# Patient Record
Sex: MALE | Race: OTHER | HISPANIC OR LATINO | URBAN - METROPOLITAN AREA
[De-identification: names, ages, dates, MRNs, and addresses within clinical notes are randomized per-mention and may not be internally consistent; named-entity substitution may affect disease eponyms.]

---

## 2022-09-19 ENCOUNTER — EMERGENCY (EMERGENCY)
Facility: HOSPITAL | Age: 27
LOS: 1 days | Discharge: LEFT BEFORE DISCHARGE | End: 2022-09-19
Attending: STUDENT IN AN ORGANIZED HEALTH CARE EDUCATION/TRAINING PROGRAM

## 2022-09-19 VITALS
SYSTOLIC BLOOD PRESSURE: 102 MMHG | WEIGHT: 164.91 LBS | HEIGHT: 65 IN | DIASTOLIC BLOOD PRESSURE: 65 MMHG | TEMPERATURE: 98 F | HEART RATE: 61 BPM | OXYGEN SATURATION: 98 % | RESPIRATION RATE: 16 BRPM

## 2022-09-19 PROCEDURE — L9991: CPT

## 2022-09-21 ENCOUNTER — EMERGENCY (EMERGENCY)
Facility: HOSPITAL | Age: 27
LOS: 1 days | Discharge: HOME | End: 2022-09-21
Payer: SELF-PAY

## 2022-09-21 VITALS
HEART RATE: 62 BPM | OXYGEN SATURATION: 97 % | WEIGHT: 139.99 LBS | RESPIRATION RATE: 18 BRPM | TEMPERATURE: 98 F | SYSTOLIC BLOOD PRESSURE: 106 MMHG | DIASTOLIC BLOOD PRESSURE: 63 MMHG | HEIGHT: 62 IN

## 2022-09-21 PROCEDURE — 99283 EMERGENCY DEPT VISIT LOW MDM: CPT | Mod: 25

## 2022-09-21 PROCEDURE — 12002 RPR S/N/AX/GEN/TRNK2.6-7.5CM: CPT

## 2022-09-21 PROCEDURE — G0463: CPT

## 2022-09-21 NOTE — ED ADULT TRIAGE NOTE - TELEPHONIC ID NUMBER OF THE INTERPRETER
[id number] Terbinafine Pregnancy And Lactation Text: This medication is Pregnancy Category B and is considered safe during pregnancy. It is also excreted in breast milk and breast feeding isn't recommended.

## 2022-09-21 NOTE — ED ADULT TRIAGE NOTE - CHIEF COMPLAINT QUOTE
patient here for left chest suture removal, unknown how many there are. states he got them last monday in [facility]. denies redness, discharge or pain to site.

## 2022-09-21 NOTE — ED PROVIDER NOTE - PHYSICAL EXAMINATION
Const: Awake, alert and oriented. In no acute distress. Well appearing.  HEENT: NC/AT. Moist mucous membranes.  Eyes: No scleral icterus. EOMI.  Neck:. Soft and supple. Full ROM without pain.  Cardiac:+S1/S2. No murmurs. Peripheral pulses 2+ and symmetric. No LE edema.  Resp: Speaking in full sentences. No evidence of respiratory distress. No wheezes, rales or rhonchi.  Abd: Soft, non-tender, non-distended. Normal bowel sounds in all 4 quadrants. No guarding or rebound.  Back: Spine midline and non-tender. No CVAT.  Skin: Wound left chest 3.0 cm healing well C/D/I , sutures in place noted   Lymph: No cervical lymphadenopathy.  Neuro: Awake, alert & oriented x 3. Moves all extremities symmetrically.

## 2022-09-21 NOTE — ED PROVIDER NOTE - NSFOLLOWUPINSTRUCTIONS_ED_ALL_ED_FT
Remoción de la sutura, cuidados posteriores    Suture Removal, Care After      La siguiente información ofrece orientación sobre cómo cuidarse después del procedimiento. El médico también podrá darle instrucciones más específicas. Comuníquese con el médico si tiene problemas o preguntas.      ¿Qué puedo esperar después del procedimiento?    Después de que le retiren los puntos (suturas), es normal tener lo siguiente:  •Molestias e hinchazón en la pio.      •Leve enrojecimiento en la pio de la herida.        Siga estas indicaciones en singh casa:    Si tiene un vendaje:     •Lávese las rene con agua y jabón phan al menos 20 segundos antes y después de cambiarse la venda (vendaje). Use desinfectante para rene si no dispone de agua y jabón.      •Cambie el vendaje abhay se lo haya indicado el médico. Si el vendaje se moja, se ensucia o tiene mal olor, cámbielo tan pronto abhay pueda.      •Si el vendaje esta adherido a la piel, aplique agua limpia tibia sobre el vendaje hasta que se afloje y se pueda retirar sin separar los bordes de la herida. Seque blanca la pio con chandrika toalla limpia y suave, dando golpecitos. No frote la herida, ya que puede sangrar.        Cuidado de la herida   Two stitched wounds. One is normal. The other is red with pus and infected. •Controle la herida todos los días para descartar signos de infección. Esté atento a los siguientes signos:  •Aumento del enrojecimiento, la hinchazón o el dolor.      •Líquido o niall.      •Calor, erupción cutánea o dureza en el lugar de la herida de reciente aparición.      • Pus o mal olor.        •Lávese las rene con agua y jabón phan al menos 20 segundos antes y después de tocar la herida. Use desinfectante para rene si no dispone de agua y jabón.      •Mantenga la herida limpia y seca. Limpie y seque la herida con golpecitos abhay se lo haya indicado el médico.      •Solo aplique un ungüento o crema según las indicaciones del médico.      •Si se aplicaron bandas adhesivas o goma para cerrar la piel después de retirar las suturas, déjelas en el lugar. Juan Alberto vez deban dejarse puestos en la piel phan 2 semanas o más. Si los bordes de las tiras adhesivas empiezan a despegarse y enroscarse, puede recortar los que estén sueltos. No retire las tiras adhesivas por completo a menos que el médico se lo indique.      •Continúe protegiendo la herida de lesiones.      • No se toque la herida. [place] puede causar chandrika infección.      Lilly     • No tome lilly de inmersión, no nade ni use el jacuzzi hasta que el médico lo autorice. Pregúntele al médico si puede ducharse.    •Siga estos pasos para ducharse:  •Si tiene un vendaje, quíteselo antes de entrar en la ducha.      •En la ducha, permita que el agua jabonosa caiga sobre la herida. Evite frotar la herida.      •Cuando salga de la ducha, seque la herida dando golpecitos con chandrika toalla limpia.      •Vuelva a aplicar un vendaje sobre la herida, si es necesario.        Cuidado de la cicatriz     Chandrika vez que la herida haya cicatrizado por completo, ayude a reducir el tamaño de la cicatriz de la siguiente manera:  •Use protector solar sobre la cicatriz o cúbrala con ropa cuando se encuentre en el exterior. Las cicatrices se queman fácilmente con el sol, lo que puede empeorar la cicatrización.      •Masajee suavemente la pio de la cicatriz. [place] puede reducir el grosor de la cicatriz.      Indicaciones generales    •Use los medicamentos de venta rima y los recetados solamente abhay se lo haya indicado el médico.      •Concurra a todas las visitas de seguimiento. [place] es importante.        Comuníquese con un médico si:    •Tiene más enrojecimiento, hinchazón o dolor alrededor de la herida.      •Observa líquido o niall que salen de la herida.      •Experimenta calor, erupción cutánea o dureza en el lugar de la herida que antes no se manifestaban.      •Observa pus o percibe mal olor que salen de la herida.      •La herida se abre.        Solicite ayuda de inmediato si:    •Tiene fiebre o escalofríos.      •Tiene líneas rice que salen de la herida.        Resumen    •Después de que le retiren los puntos, es común tener molestias e hinchazón en la pio.      •Lávese las rene con agua y jabón antes de cambiar las vendas (vendaje).      •Mantenga la herida limpia y seca. No tome lilly de inmersión, no nade ni use el jacuzzi hasta que el médico lo autorice.      Esta información no tiene abhay fin reemplazar el consejo del médico. Asegúrese de hacerle al médico cualquier pregunta que tenga.
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
## 2022-09-21 NOTE — ED PROVIDER NOTE - PATIENT PORTAL LINK FT
You can access the FollowMyHealth Patient Portal offered by [facility] by registering at the following website: [email or web address]. By joining [facility]’s FollowMyHealth portal, you will also be able to view your health information using other applications (apps) compatible with our system.

## 2022-12-12 ENCOUNTER — EMERGENCY (EMERGENCY)
Facility: HOSPITAL | Age: 27
LOS: 1 days | Discharge: HOME | End: 2022-12-12
Admitting: STUDENT IN AN ORGANIZED HEALTH CARE EDUCATION/TRAINING PROGRAM
Payer: MEDICAID

## 2022-12-12 VITALS
SYSTOLIC BLOOD PRESSURE: 133 MMHG | RESPIRATION RATE: 18 BRPM | OXYGEN SATURATION: 100 % | HEART RATE: 56 BPM | DIASTOLIC BLOOD PRESSURE: 77 MMHG | TEMPERATURE: 98 F
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
## 2022-12-12 DIAGNOSIS — S51.812A LACERATION WITHOUT FOREIGN BODY OF LEFT FOREARM, INITIAL ENCOUNTER: ICD-10-CM

## 2022-12-12 PROCEDURE — 12002 RPR S/N/AX/GEN/TRNK2.6-7.5CM: CPT

## 2022-12-12 PROCEDURE — 99283 EMERGENCY DEPT VISIT LOW MDM: CPT | Mod: 25

## 2022-12-12 PROCEDURE — 99282 EMERGENCY DEPT VISIT SF MDM: CPT

## 2022-12-12 RX ORDER — IBUPROFEN 200 MG
600 TABLET ORAL ONCE
Refills: 0 | Status: COMPLETED | OUTPATIENT
Start: 2022-12-12 | End: 2022-12-12

## 2022-12-12 RX ORDER — LIDOCAINE HCL 20 MG/ML
10 VIAL (ML) INJECTION ONCE
Refills: 0 | Status: COMPLETED | OUTPATIENT
Start: 2022-12-12 | End: 2022-12-12

## 2022-12-12 RX ADMIN — Medication 10 MILLILITER(S): at 11:49

## 2022-12-12 RX ADMIN — Medication 600 MILLIGRAM(S): at 11:48
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
## 2022-12-12 NOTE — ED PROVIDER NOTE - CLINICAL SUMMARY MEDICAL DECISION MAKING FREE TEXT BOX
Afebrile and hemodynamically stable. He has 3cm left forearm laceration. His LUE is neurovascularly intact. Wound irrigated and anesthetized with lidocaine 1%. 8 simple interrupted sutures placed without complication. Tdap is up to date. Counseled on wound care and return precautions.

## 2022-12-12 NOTE — ED PROVIDER NOTE - PATIENT PORTAL LINK FT
You can access the FollowMyHealth Patient Portal offered by [facility] by registering at the following website: [email or web address]. By joining [facility]’s FollowMyHealth portal, you will also be able to view your health information using other applications (apps) compatible with our system.

## 2022-12-12 NOTE — ED PROVIDER NOTE - PHYSICAL EXAMINATION
VITAL SIGNS: I have reviewed nursing notes and confirm.  CONSTITUTIONAL: Well-developed; in no acute distress.   SKIN:  warm and dry, no acute rash.   HEAD:  normocephalic, atraumatic.  EYES: PERRL, EOM intact; conjunctiva and sclera clear.  ENT: No nasal discharge; airway clear.   NECK: Supple; non tender.  EXT: Normal ROM. No clubbing, cyanosis or edema. 2+ pulses to b/l ue/le.  NEURO: Alert, oriented, grossly unremarkable  PSYCH: Cooperative, mood and affect appropriate.    LUE: FROM at L elbow, wrist and fingers. He has a 3cm linear laceration over the ventral aspect of the left forearm. Sensation and strength intact in median, ulnar and radial nerve distributions. 2+ radial pulse. Distal sensation intact. Cap refill <2 sec.

## 2022-12-12 NOTE — ED PROVIDER NOTE - NSFOLLOWUPINSTRUCTIONS_ED_ALL_ED_FT
Deje el vendaje en por 24 horas. Despues cambie el vendaje diariamente y lave la herida con agua tibia.     Regrese en 10 sigala para quitar los puntos.      Cuidado de un desgarro, en adultos    Laceration Care, Adult      Un desgarro es un socrates que puede atravesar todas las capas de la piel hasta el tejido que se encuentra debajo de la piel. Algunos desgarros cicatrizan por sí solos. Otros se deben cerrar con puntos (suturas), grapas, tiras adhesivas para la piel o goma para cerrar la piel.    El cuidado adecuado de un desgarro reduce el riesgo de infección, ayuda a que el desgarro cierre mejor, y puede prevenir la formación de cicatrices.      Consejos generales    •Mantenga la herida limpia y seca.      • No se rasque ni se toque la herida.      •Lávese las rene con agua y jabón madonna al menos 20 segundos antes y después de tocar la herida o cambiarse la venda (vendaje). Use desinfectante para rene si no dispone de agua y jabón.      • No usedesinfectantes ni antisépticos, abhay alcohol para frotar, para limpiar la herida, a menos que se lo indique singh médico.      •Si le colocaron un vendaje, cámbielo al menos chandrika vez al día o abhay se lo haya indicado el médico. También debe cambiarlo si se moja o se ensucia.        Cómo cuidar del desgarro    Si se utilizaron suturas o grapas:     •Mantenga la herida completamente seca madonna las primeras 24 horas o abhay se lo haya indicado el médico. Transcurrido glen tiempo, puede ducharse o akbar lilly de inmersión. No se sumerja en agua hasta que le hayan quitado las suturas o grapas.    •Limpie la herida chandrika vez por día o abahy se lo haya indicado el médico. Para hacer esto:  •Lave la herida con agua y jabón.      •Enjuáguela con agua para quitar todo el jabón.      •Séquela dando palmaditas con chandrika toalla limpia. No frote la herida.        •Después de limpiar la herida, aplique chandrika delgada capa de ungüento con antibiótico, otros ungüentos tópicos, o un vendaje no adherente abhay se lo haya indicado el médico. Herminie ayudará a prevenir infecciones y a evitar que el vendaje se adhiera a la herida.      •Maxim que le retiren las suturas o las grapas abhay se lo haya indicado el médico. No retire las suturas ni las grapas usted mismo.      Si se utilizaron tiras adhesivas para la piel:     • No deje que las tiras adhesivas para la piel se mojen. Puede bañarse o ducharse, silvio tenga cuidado de mantener la herida seca.      •Si se moja, séquela dando palmaditas con chandrika toalla limpia. No frote la herida.      •Las tiras adhesivas para la piel se caen solas. Si los bordes de las tiras adhesivas empiezan a despegarse y enroscarse, puede recortar los que estén sueltos. No retire las tiras adhesivas por completo a menos que el médico se lo indique.      Si se utilizó goma para cerrar la piel:     •Puede bañarse o ducharse, silvio tenga cuidado de mantener la herida seca. No sumerja la herida en agua.      •Después de ducharse o darse un baño, seque el socrates dando palmaditas con chandrika toalla limpia. No frote la herida.      • No practique actividades que lo joel transpirar mucho hasta que la goma para cerrar la piel se haya caído melany.      • No aplique líquidos, cremas ni ungüentos medicinales en la herida mientras todavía tenga la goma para cerrar la piel. De lo contrario, puede aflojar la película antes de que la herida cicatrice.      •Si la herida está cubierta con un vendaje, no aplique cinta adhesiva directamente sobre la goma para cerrar la piel. De lo contrario, puede hacer que la goma se despegue antes de que la herida cicatrice.      • No toque la goma. La goma para cerrar la piel generalmente permanece sobre la piel de 5 a 10 días y luego se  melany.        Siga estas instrucciones en singh casa:    Medicamentos     •Use los medicamentos de venta rima y los recetados solamente abhay se lo haya indicado el médico.      •Si le recetaron un medicamento o ungüento con antibiótico, tómelo o aplíqueselo abhay se lo haya indicado el médico. No deje de usarlo aunque la afección mejore.      Control del dolor y la hinchazón   •Si se lo indican, aplique hielo sobre la pio de la lesión. Para hacer esto:  •Ponga el hielo en chandrika bolsa plástica.      •Coloque chandriak toalla entre la piel y la bolsa.      •Aplique el hielo madonna 20 minutos, 2 o 3 veces por día.      •Retire el hielo si la piel se pone de color jason brillante. Herminie es muy importante. Si no puede sentir dolor, calor o frío, tiene un mayor riesgo de que se dañe la pio.        •Madonna las primeras 24 a 48 horas después de que le hayan reparado el desgarro, cuando esté sentado o acostado, levante (eleve) la pio de la lesión por encima del nivel del corazón.        Instrucciones generales   Two wounds closed with skin glue. One is normal. The other is red with pus and infected.   •Evite cualquier actividad que pueda hacer que el desgarro vuelva a abrirse.    •Controle la herida todos los días para detectar signos de infección. Esté atento a lo siguiente:  •Aumento del enrojecimiento, la hinchazón o el dolor.      •Líquido o niall.      •Calor.      •Pus o mal olor.        •Concurra a todas las visitas de seguimiento. Herminie es importante.        Comuníquese con un médico si:    •Le aplicaron la vacuna antitetánica y tiene hinchazón, dolor intenso, enrojecimiento o hemorragia en el sitio de la inyección.      •La herida cerrada se abre.    •Tiene cualquiera de estos signos de infección:  •Más enrojecimiento, hinchazón o dolor alrededor de la herida.      •Líquido o niall que salen de la herida.      •Calor que proviene de la herida.      •Pus o mal olor proveniente de la herida.      •Fiebre.        •Nota un cuerpo extraño en la herida, abhay un trozo de meyer o theodora.      •El dolor no se cassandra con los medicamentos.      •Observa que la piel cerca de la herida cambia de color.      •Necesita cambiar el vendaje con frecuencia.      •Presenta chandrika nueva erupción cutánea.      •Tiene entumecimiento alrededor de la herida.        Solicite ayuda de inmediato si:    •Tiene mucha hinchazón alrededor de la herida.      •El dolor aumenta repentinamente y es intenso.      •Presenta nódulos dolorosos cerca de la herida o en la piel en cualquier pio del cuerpo.      •Tiene chandrika línea roman que sale de la herida.      •La herida está en la mano o en el pie, y no puede  correctamente wilbur de los dedos.      •La herida está en la mano o en el pie y observa que los dedos tienen un lindy pálido o azulado.        Resumen    •Un desgarro es un socrates que puede atravesar todas las capas de la piel hasta el tejido que se encuentra debajo de la piel.      •Algunos desgarros cicatrizan por sí solos. Otros se deben cerrar con puntos (suturas), grapas, tiras adhesivas para la piel o goma para cerrar la piel.      •El cuidado adecuado de un desgarro reduce el riesgo de infección, ayuda a que el desgarro cierre mejor, y puede prevenir la formación de cicatrices.      Esta información no tiene abhay fin reemplazar el consejo del médico. Asegúrese de hacerle al médico cualquier pregunta que tenga.
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
## 2022-12-26 ENCOUNTER — EMERGENCY (EMERGENCY)
Facility: HOSPITAL | Age: 27
LOS: 1 days | Discharge: HOME | End: 2022-12-26
Admitting: STUDENT IN AN ORGANIZED HEALTH CARE EDUCATION/TRAINING PROGRAM
Payer: MEDICAID

## 2022-12-26 VITALS
HEART RATE: 70 BPM | SYSTOLIC BLOOD PRESSURE: 126 MMHG | RESPIRATION RATE: 16 BRPM | WEIGHT: 146.61 LBS | TEMPERATURE: 98 F | HEIGHT: 61 IN | OXYGEN SATURATION: 98 % | DIASTOLIC BLOOD PRESSURE: 69 MMHG

## 2022-12-26 PROCEDURE — L9995: CPT

## 2022-12-26 PROCEDURE — 99212 OFFICE O/P EST SF 10 MIN: CPT

## 2022-12-26 RX ORDER — BACITRACIN ZINC 500 UNIT/G
1 OINTMENT IN PACKET (EA) TOPICAL ONCE
Refills: 0 | Status: COMPLETED | OUTPATIENT
Start: 2022-12-26 | End: 2022-12-26

## 2022-12-26 RX ADMIN — Medication 1 APPLICATION(S): at 17:47

## 2022-12-26 NOTE — ED ADULT NURSE NOTE - NSIMPLEMENTINTERV_GEN_ALL_ED
Implemented All Universal Safety Interventions:  [place] to call system. Call bell, personal items and telephone within reach. Instruct patient to call for assistance. Room bathroom lighting operational. Non-slip footwear when patient is off stretcher. Physically safe environment: no spills, clutter or unnecessary equipment. Stretcher in lowest position, wheels locked, appropriate side rails in place.

## 2022-12-26 NOTE — ED PROVIDER NOTE - SKIN, MLM
Skin normal color for race, warm, dry and intact. No evidence of rash, sutures noted in place on left forearm, no surrounding erythema

## 2022-12-26 NOTE — ED PROVIDER NOTE - CLINICAL SUMMARY MEDICAL DECISION MAKING FREE TEXT BOX
Pt seen for suture removal    no signs of infection, sutures removed succesfully discharged home in stable condition

## 2022-12-26 NOTE — ED PROVIDER NOTE - NSFOLLOWUPINSTRUCTIONS_ED_ALL_ED_FT
Apply  over the counter bacitracin or neosporin once a day      return to the ed for any worsening or alarming symptoms         Remoción de la sutura, cuidados posteriores    Suture Removal, Care After      La siguiente información ofrece orientación sobre cómo cuidarse después del procedimiento. El médico también podrá darle instrucciones más específicas. Comuníquese con el médico si tiene problemas o preguntas.      ¿Qué puedo esperar después del procedimiento?    Después de que le retiren los puntos (suturas), es normal tener lo siguiente:  •Molestias e hinchazón en la pio.      •Leve enrojecimiento en la pio de la herida.        Siga estas indicaciones en singh casa:    Si tiene un vendaje:     •Lávese las rene con agua y jabón phan al menos 20 segundos antes y después de cambiarse la venda (vendaje). Use desinfectante para rene si no dispone de agua y jabón.      •Cambie el vendaje abhay se lo haya indicado el médico. Si el vendaje se moja, se ensucia o tiene mal olor, cámbielo tan pronto abhay pueda.      •Si el vendaje esta adherido a la piel, aplique agua limpia tibia sobre el vendaje hasta que se afloje y se pueda retirar sin separar los bordes de la herida. Seque blanca la pio con chandrika toalla limpia y suave, dando golpecitos. No frote la herida, ya que puede sangrar.        Cuidado de la herida   Two stitched wounds. One is normal. The other is red with pus and infected. •Controle la herida todos los días para descartar signos de infección. Esté atento a los siguientes signos:  •Aumento del enrojecimiento, la hinchazón o el dolor.      •Líquido o niall.      •Calor, erupción cutánea o dureza en el lugar de la herida de reciente aparición.      • Pus o mal olor.        •Lávese las rene con agua y jabón phan al menos 20 segundos antes y después de tocar la herida. Use desinfectante para rene si no dispone de agua y jabón.      •Mantenga la herida limpia y seca. Limpie y seque la herida con golpecitos abhay se lo haya indicado el médico.      •Solo aplique un ungüento o crema según las indicaciones del médico.      •Si se aplicaron bandas adhesivas o goma para cerrar la piel después de retirar las suturas, déjelas en el lugar. Juan Alberto vez deban dejarse puestos en la piel phan 2 semanas o más. Si los bordes de las tiras adhesivas empiezan a despegarse y enroscarse, puede recortar los que estén sueltos. No retire las tiras adhesivas por completo a menos que el médico se lo indique.      •Continúe protegiendo la herida de lesiones.      • No se toque la herida. [place] puede causar chandrika infección.      Lilly     • No tome lilly de inmersión, no nade ni use el jacuzzi hasta que el médico lo autorice. Pregúntele al médico si puede ducharse.    •Siga estos pasos para ducharse:  •Si tiene un vendaje, quíteselo antes de entrar en la ducha.      •En la ducha, permita que el agua jabonosa caiga sobre la herida. Evite frotar la herida.      •Cuando salga de la ducha, seque la herida dando golpecitos con chandrika toalla limpia.      •Vuelva a aplicar un vendaje sobre la herida, si es necesario.        Cuidado de la cicatriz     Chandrika vez que la herida haya cicatrizado por completo, ayude a reducir el tamaño de la cicatriz de la siguiente manera:  •Use protector solar sobre la cicatriz o cúbrala con ropa cuando se encuentre en el exterior. Las cicatrices se queman fácilmente con el sol, lo que puede empeorar la cicatrización.      •Masajee suavemente la pio de la cicatriz. [place] puede reducir el grosor de la cicatriz.      Indicaciones generales    •Use los medicamentos de venta rima y los recetados solamente abhay se lo haya indicado el médico.      •Concurra a todas las visitas de seguimiento. [place] es importante.        Comuníquese con un médico si:    •Tiene más enrojecimiento, hinchazón o dolor alrededor de la herida.      •Observa líquido o niall que salen de la herida.      •Experimenta calor, erupción cutánea o dureza en el lugar de la herida que antes no se manifestaban.      •Observa pus o percibe mal olor que salen de la herida.      •La herida se abre.        Solicite ayuda de inmediato si:    •Tiene fiebre o escalofríos.      •Tiene líneas rice que salen de la herida.        Resumen    •Después de que le retiren los puntos, es común tener molestias e hinchazón en la pio.      •Lávese las rene con agua y jabón antes de cambiar las vendas (vendaje).      •Mantenga la herida limpia y seca. No tome lilly de inmersión, no nade ni use el jacuzzi hasta que el médico lo autorice.      Esta información no tiene abhay fin reemplazar el consejo del médico. Asegúrese de hacerle al médico cualquier pregunta que tenga.

## 2022-12-26 NOTE — ED PROVIDER NOTE - ENMT, MLM
Airway patent, Nasal mucosa clear. Mouth with normal mucosa. Throat has no vesicles, no oropharyngeal exudates and uvula is midline. Negative

## 2022-12-26 NOTE — ED PROVIDER NOTE - OBJECTIVE STATEMENT
27 year old male with no major med problems , seen for suture removal from left forearm.   Pt denies fever, chills, had lac repaired at [facility] on 12/12/22

## 2022-12-26 NOTE — ED PROVIDER NOTE - PATIENT PORTAL LINK FT
You can access the FollowMyHealth Patient Portal offered by [facility] by registering at the following website: [email or web address]. By joining [facility]’s FollowMyHealth portal, you will also be able to view your health information using other applications (apps) compatible with our system.

## 2022-12-27 PROBLEM — Z78.9 OTHER SPECIFIED HEALTH STATUS: Chronic | Status: ACTIVE | Noted: 2022-12-12

## 2022-12-28 DIAGNOSIS — S51.812D LACERATION WITHOUT FOREIGN BODY OF LEFT FOREARM, SUBSEQUENT ENCOUNTER: ICD-10-CM
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
